# Patient Record
Sex: MALE | Race: BLACK OR AFRICAN AMERICAN | URBAN - METROPOLITAN AREA
[De-identification: names, ages, dates, MRNs, and addresses within clinical notes are randomized per-mention and may not be internally consistent; named-entity substitution may affect disease eponyms.]

---

## 2017-06-15 ENCOUNTER — OFFICE VISIT (OUTPATIENT)
Dept: ORTHOPEDIC SURGERY | Age: 52
End: 2017-06-15

## 2017-06-15 VITALS
WEIGHT: 209 LBS | SYSTOLIC BLOOD PRESSURE: 115 MMHG | HEART RATE: 60 BPM | BODY MASS INDEX: 30.96 KG/M2 | HEIGHT: 69 IN | DIASTOLIC BLOOD PRESSURE: 78 MMHG

## 2017-06-15 DIAGNOSIS — M47.816 SPONDYLOSIS WITHOUT MYELOPATHY OR RADICULOPATHY, LUMBAR REGION: ICD-10-CM

## 2017-06-15 DIAGNOSIS — M43.10 SPONDYLOLISTHESIS, ACQUIRED: ICD-10-CM

## 2017-06-15 DIAGNOSIS — M51.36 OTHER INTERVERTEBRAL DISC DEGENERATION, LUMBAR REGION: ICD-10-CM

## 2017-06-15 DIAGNOSIS — M54.50 NONSPECIFIC PAIN IN THE LUMBAR REGION: Primary | ICD-10-CM

## 2017-06-15 RX ORDER — BISMUTH SUBSALICYLATE 262 MG
1 TABLET,CHEWABLE ORAL DAILY
COMMUNITY

## 2017-06-15 RX ORDER — GABAPENTIN 300 MG/1
300 CAPSULE ORAL 3 TIMES DAILY
Qty: 90 CAP | Refills: 1 | Status: SHIPPED | OUTPATIENT
Start: 2017-06-15

## 2017-06-15 NOTE — MR AVS SNAPSHOT
Visit Information Date & Time Provider Department Dept. Phone Encounter #  
 6/15/2017  9:30 AM Axel Hager, [address] [facility] [phone] [id number] Follow-up Instructions Return in about 4 weeks (around 7/13/2017). Upcoming Health Maintenance Date Due Hepatitis C Screening 1965 DTaP/Tdap/Td series (1 - Tdap) 7/6/1985 FOBT Q 1 YEAR AGE 50-75 7/6/2014 INFLUENZA AGE 9 TO ADULT 8/1/2017 Allergies as of 6/15/2017  Review Complete On: 6/15/2017 By: Axel Hager MD  
  
 Severity Noted Reaction Type Reactions Tramadol  06/15/2017    Nausea Only, Other (comments)  
 vertigo Current Immunizations  Never Reviewed No immunizations on file. Not reviewed this visit You Were Diagnosed With   
  
 Codes Comments Nonspecific pain in the lumbar region    -  Primary ICD-10-CM: M54.5 ICD-9-CM: 724.2 Spondylolisthesis, acquired     ICD-10-CM: M43.10 ICD-9-CM: 738.4 Spondylosis without myelopathy or radiculopathy, lumbar region     ICD-10-CM: M47.816 ICD-9-CM: 721.3 Other intervertebral disc degeneration, lumbar region     ICD-10-CM: M51.36 
ICD-9-CM: 722.52 Vitals BP Pulse Height(growth percentile) Weight(growth percentile) BMI Smoking Status 115/78 60 5' 9\" (1.753 m) 209 lb (94.8 kg) 30.86 kg/m2 Never Smoker Vitals History BMI and BSA Data Body Mass Index Body Surface Area  
 30.86 kg/m 2 2.15 m 2 Preferred Pharmacy Pharmacy Name Phone [facility] [address], [address] [facility] Your Updated Medication List  
  
   
This list is accurate as of: 6/15/17 10:54 AM.  Always use your most recent med list.  
  
  
  
  
 gabapentin 300 mg capsule Commonly known as:  NEURONTIN Take 1 Cap by mouth three (3) times daily. multivitamin tablet Commonly known as:  ONE A DAY Take 1 Tab by mouth daily.   
  
 VITAMIN D2 PO  
 Take  by mouth. Prescriptions Sent to Pharmacy Refills  
 gabapentin (NEURONTIN) 300 mg capsule 1 Sig: Take 1 Cap by mouth three (3) times daily. Class: Normal  
 Pharmacy: [address] [place] [address] Ph #: [phone] Route: Oral  
  
We Performed the Following AMB POC XRAY, SPINE, LUMBOSACRAL; 2 O [49704 CPT(R)] Follow-up Instructions Return in about 4 weeks (around 2017). Patient Instructions [employer] Activation Thank you for requesting access to RIGID. Please follow the instructions below to securely access and download your online medical record. RIGID allows you to send messages to your doctor, view your test results, renew your prescriptions, schedule appointments, and more. How Do I Sign Up? 1. In your internet browser, go to [email or web address] 
2. Click on the First Time User? Click Here link in the Sign In box. You will be redirect to the New Member Sign Up page. 3. Enter your RIGID Access Code exactly as it appears below. You will not need to use this code after youve completed the sign-up process. If you do not sign up before the expiration date, you must request a new code. RIGID Access Code: [email or web address] Expires: 2017  9:07 AM (This is the date your RIGID access code will ) 4. Enter the last four digits of your Social Security Number (xxxx) and Date of Birth (mm/dd/yyyy) as indicated and click Submit. You will be taken to the next sign-up page. 5. Create a RIGID ID. This will be your RIGID login ID and cannot be changed, so think of one that is secure and easy to remember. 6. Create a RIGID password. You can change your password at any time. 7. Enter your Password Reset Question and Answer. This can be used at a later time if you forget your password. 8. Enter your e-mail address.  You will receive e-mail notification when new information is available in [employer]. 9. Click Sign Up. You can now view and download portions of your medical record. 10. Click the Download Summary menu link to download a portable copy of your medical information. Additional Information If you have questions, please visit the Frequently Asked Questions section of the [employer] website at [email or web address] Remember, [employer] is NOT to be used for urgent needs. For medical emergencies, dial 911. Introducing [facility]! Micahjose Henderson introduces [employer] patient portal. Now you can access parts of your medical record, email your doctor's office, and request medication refills online. 1. In your internet browser, go to [email or web address] 2. Click on the First Time User? Click Here link in the Sign In box. You will see the New Member Sign Up page. 3. Enter your [employer] Access Code exactly as it appears below. You will not need to use this code after youve completed the sign-up process. If you do not sign up before the expiration date, you must request a new code. · [employer] Access Code: [email or web address] Expires: 9/13/2017  9:07 AM 
 
4. Enter the last four digits of your Social Security Number (xxxx) and Date of Birth (mm/dd/yyyy) as indicated and click Submit. You will be taken to the next sign-up page. 5. Create a [employer] ID. This will be your [employer] login ID and cannot be changed, so think of one that is secure and easy to remember. 6. Create a [employer] password. You can change your password at any time. 7. Enter your Password Reset Question and Answer. This can be used at a later time if you forget your password. 8. Enter your e-mail address. You will receive e-mail notification when new information is available in [address] 9. Click Sign Up. You can now view and download portions of your medical record.  
10. Click the Download Summary menu link to download a portable copy of your medical information. If you have questions, please visit the Frequently Asked Questions section of the [employer] website. Remember, [employer] is NOT to be used for urgent needs. For medical emergencies, dial 911. Now available from your iPhone and Android! Please provide this summary of care documentation to your next provider. If you have any questions after today's visit, please call [phone].

## 2017-06-15 NOTE — PATIENT INSTRUCTIONS
"[employer] Activation    Thank you for requesting access to "[employer]. Please follow the instructions below to securely access and download your online medical record. "[employer] allows you to send messages to your doctor, view your test results, renew your prescriptions, schedule appointments, and more. How Do I Sign Up? 1. In your internet browser, go to [email or web address]  2. Click on the First Time User? Click Here link in the Sign In box. You will be redirect to the New Member Sign Up page. 3. Enter your "[employer] Access Code exactly as it appears below. You will not need to use this code after youve completed the sign-up process. If you do not sign up before the expiration date, you must request a new code. "[employer] Access Code: [email or web address]  Expires: 2017  9:07 AM (This is the date your "[employer] access code will )    4. Enter the last four digits of your Social Security Number (xxxx) and Date of Birth (mm/dd/yyyy) as indicated and click Submit. You will be taken to the next sign-up page. 5. Create a "[employer] ID. This will be your "[employer] login ID and cannot be changed, so think of one that is secure and easy to remember. 6. Create a "[employer] password. You can change your password at any time. 7. Enter your Password Reset Question and Answer. This can be used at a later time if you forget your password. 8. Enter your e-mail address. You will receive e-mail notification when new information is available in [address] 9. Click Sign Up. You can now view and download portions of your medical record. 10. Click the Download Summary menu link to download a portable copy of your medical information. Additional Information    If you have questions, please visit the Frequently Asked Questions section of the "[employer] website at [email or web address] Remember, "[employer] is NOT to be used for urgent needs. For medical emergencies, dial 911.

## 2017-06-15 NOTE — PROGRESS NOTES
[facility]  [address]  [address], [address]  Phone: [phone]  Fax: [phone]        INITIAL CONSULTATION      HISTORY OF PRESENT ILLNESS:  Brianna Krishnan is a 45 y.o. male whom is self-referred secondary to chronic lumbar pain radiating into the BLE (R>L) extending circumferentially to the feet with paraesthesias ongoing since 1988. He describes BLE as a burning sensation. 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 Prior to this injury, he denies having low back pain. Pt states he is trying to have a claim through [employer]. tHe rates pain 5/10. Pt states his symptoms are progressively worsening and his flare ups are becoming more frequent. His pain is exacerbated with any sustained movement or position. He denies h/o lumbar spinal surgery. Pt reports possible remote h/o lumbar blocks. He has not recently attended physical therapy/chiropractor. ER note dated 10/24/15 indicating patient presented for neck and shoulder pain as well as back pain. At that time, he was treated with Tramadol and Naproxen. ER note dated 10/28/16 indicating patient was seen for chronic back pain. At that time, he was treated with Diclofenac, Norco and Flexeril. ER note dated 12/8/16 indicating patient presented for chronic low back pain without sciatica. At that time, he was treated with Norco and Flexeril. ER note dated 1/19/16 indicating patient presented for chronic left-sided low back pain without sciatica. At that time, he was treated with Valium, oral steroids and Tramadol. ER note dated 2/7/17 indicating patient presented for exacerbation of chronic back pain radiating into the BLE, worse with activity and better with rest.  At that time, he was treated with Voltaren and Flexeril. He reports urinary frequency but denies incontinence. Pt denies fever, weight loss, or skin changes. Lumbar spine XR report dated 10/28/16 reviewed.  Films were not available for my review. Per report, transitional anatomy. There are 4 non-rib-bearing lumbar vertebral bodies. Age-indeterminate mild anterior wedging of L1. There is normal alignment. The intervertebral disc spaces are relatively well-preserved. Osseous mineralization is radiographically normal.  reviewed. History reviewed. No pertinent past medical history. History reviewed. No pertinent surgical history. Social History   Substance Use Topics    Smoking status: Never Smoker    Smokeless tobacco: Not on file    Alcohol use No     Work status: Not available. Marital status: Not available. Allergies   Allergen Reactions    Tramadol Nausea Only and Other (comments)     vertigo          History reviewed. No pertinent family history. REVIEW OF SYSTEMS  Constitutional symptoms: Negative  Eyes: Negative  Ears, Nose, Throat, and Mouth: Negative  Cardiovascular: Negative  Respiratory: Negative  Genitourinary: Negative  Integumentary (Skin and/or breast): Negative  Musculoskeletal: Positive for low back pain into the BLE. Extremities: Negative for edema. Endocrine/Rheumatologic: Negative  Hematologic/Lymphatic: Negative  Allergic/Immunologic: Negative  Psychiatric: Negative       PHYSICAL EXAMINATION    Visit Vitals    /78    Pulse 60    Ht 5' 9\" (1.753 m)    Wt 209 lb (94.8 kg)    BMI 30.86 kg/m2       CONSTITUTIONAL: NAD, A&O x 3  HEART: Regular rate and rhythm  ABDOMEN: Positive bowel sounds, soft, nontender, and nondistended  LUNGS: Clear to auscultation bilaterally. SKIN: No rashes noted. RANGE OF MOTION: Good ROM, bilateral hips. The patient has full passive range of motion in all four extremities. SENSATION: Increased groin pain with internal rotation of left hip. Decreased sensation to light touch at digits 2-3 of the RUE. Sensation to light touch otherwise intact.   MOTOR:   Straight Leg Raise: Negative, bilateral  Gomez: Negative, bilateral  Deep tendon reflexes are 1+ at the brachioradialis, biceps, and triceps. Deep tendon reflexes are 0 at the right knee, 2+ at the left knee and 1+ at the ankles bilaterally. Shoulder AB/Flex Elbow Flex Wrist Ext Elbow Ext Wrist Flex Hand Intrin Tone   Right +4/5 +4/5 +4/5 +4/5 +4/5 +4/5 +4/5   Left +4/5 +4/5 +4/5 +4/5 +4/5 +4/5 +4/5              Hip Flex Knee Ext Knee Flex Ankle DF GTE Ankle PF Tone   Right +4/5 +4/5 +4/5 +4/5 +4/5 +4/5 +4/5   Left +4/5 +4/5 +4/5 +4/5 +4/5 +4/5 +4/5     RADIOGRAPHS  Preliminary reading of lumbar plain films:  Slight retrolisthesis of L4 on L5 and L5 on S1. Mild disc space narrowing at L1-2 L2-3. Small anterior osteophytes noted at L2-3-4-L5. No acute pathology identified. These are being sent out for official reading by Dr. Bernie Weaver. ASSESSMENT   Debi Lopez was seen today for back pain. Diagnoses and all orders for this visit:    Nonspecific pain in the lumbar region  -     [10594] L/S 2-3 views    Spondylolisthesis, acquired    Spondylosis without myelopathy or radiculopathy, lumbar region    Other intervertebral disc degeneration, lumbar region    Other orders  -     gabapentin (NEURONTIN) 300 mg capsule; Take 1 Cap by mouth three (3) times daily. IMPRESSIONS/RECOMMENDATIONS:  He presents for chronic low back pain with bilateral radicular type symptoms ongoing x ~ 30 years. Patient is neurologically intact. I discussed with patient multiple treatment options. I will try him on Neurontin 300 mg TID. The risks, benefits, and potential side effects of this medication were discussed. Patient understands and wishes to proceed. Patient advised to call the office if intolerant to new medication. Physical therapy deferred due to patient finances. I will see the patient back in 1 month's time or earlier if needed. Written by Imelda Giron, as dictated by Fito Henriquez MD  I examined the patient, reviewed and agree with the note.

## 2017-06-23 ENCOUNTER — OFFICE VISIT (OUTPATIENT)
Dept: FAMILY MEDICINE CLINIC | Age: 52
End: 2017-06-23

## 2017-06-23 VITALS
HEIGHT: 69 IN | HEART RATE: 69 BPM | DIASTOLIC BLOOD PRESSURE: 81 MMHG | RESPIRATION RATE: 16 BRPM | TEMPERATURE: 97.7 F | WEIGHT: 210 LBS | SYSTOLIC BLOOD PRESSURE: 124 MMHG | OXYGEN SATURATION: 98 % | BODY MASS INDEX: 31.1 KG/M2

## 2017-06-23 DIAGNOSIS — M54.50 NONSPECIFIC PAIN IN THE LUMBAR REGION: Primary | ICD-10-CM

## 2017-06-23 NOTE — PROGRESS NOTES
No chief complaint on file. 1. When and where did you last receive medical care? Yes When: 6/2017 Dr. Karol Sparrow    2. When and where did you last have preventive care such as mammogram, pap smears or colon screening? No history of colon screening. 3. What is your current living situation (for example, live alone, live in home with immediate family members)? Lives with spoise    4. Do you have any problems with communication such trouble seeing, hearing, or understanding instructions? No    5. Do you have an advance directive? This is a document that you can give to family members with instructions for how you would want them to make health care decisions for you if you were unable to speak for yourself. (For example, unconscious, delerious)No    PMH/FH/Social Hx reviewed and updated as needed     Applicable screenings reviewed and updated as needed  Medication reconciliation performed. Patient does not know need medication refills. Health Maintenance reviewed.

## 2017-06-23 NOTE — PROGRESS NOTES
Discharge instructions reviewed with patient    Medication list and understanding of medications reviewed with patient. OTC and herbal medications reviewed and added to med list if applicable  Barriers to adherence assessed. Guidance given regarding new medications this visit, including reason for taking this medicine, and common side effects.     Given AVS.

## 2017-06-23 NOTE — MR AVS SNAPSHOT
Visit Information Date & Time Provider Department Dept. Phone Encounter #  
 6/23/2017  9:15 AM Lui Cabezas, [address] [place] [address] [phone] [id number] Your Appointments 8/3/2017  8:45 AM  
Follow Up with True Kumar MD  
[facility] (Angela Miller) Appt Note: 1 mo f/u low back [address] [facility] [address]  
[phone]  
  
   
 [address] [address] [facility] [address] Upcoming Health Maintenance Date Due Hepatitis C Screening 1965 DTaP/Tdap/Td series (1 - Tdap) 7/6/1985 FOBT Q 1 YEAR AGE 50-75 7/6/2014 INFLUENZA AGE 9 TO ADULT 8/1/2017 Allergies as of 6/23/2017  Review Complete On: 6/23/2017 By: Lui Cabezas MD  
  
 Severity Noted Reaction Type Reactions Tramadol  06/15/2017    Nausea Only, Other (comments)  
 vertigo Current Immunizations  Never Reviewed No immunizations on file. Not reviewed this visit You Were Diagnosed With   
  
 Codes Comments Nonspecific pain in the lumbar region    -  Primary ICD-10-CM: M54.5 ICD-9-CM: 724.2 Vitals BP Pulse Temp Resp Height(growth percentile) Weight(growth percentile) 124/81 (BP 1 Location: Left arm, BP Patient Position: Sitting) 69 97.7 °F (36.5 °C) (Oral) 16 5' 9\" (1.753 m) 210 lb (95.3 kg) SpO2 BMI Smoking Status 98% 31.01 kg/m2 Never Smoker BMI and BSA Data Body Mass Index Body Surface Area 31.01 kg/m 2 2.15 m 2 Preferred Pharmacy Pharmacy Name Phone [facility] [address], [address] [facility] Your Updated Medication List  
  
   
This list is accurate as of: 6/23/17 10:49 AM.  Always use your most recent med list.  
  
  
  
  
 gabapentin 300 mg capsule Commonly known as:  NEURONTIN Take 1 Cap by mouth three (3) times daily. multivitamin tablet Commonly known as:  ONE A DAY  
 Take 1 Tab by mouth daily. VITAMIN D2 PO Take  by mouth. Introducing [facility]! Niko Lambert introduces [employer] patient portal. Now you can access parts of your medical record, email your doctor's office, and request medication refills online. 1. In your internet browser, go to [email or web address] 2. Click on the First Time User? Click Here link in the Sign In box. You will see the New Member Sign Up page. 3. Enter your TimeLab Access Code exactly as it appears below. You will not need to use this code after youve completed the sign-up process. If you do not sign up before the expiration date, you must request a new code. · [employer] Access Code: [email or web address] Expires: 9/13/2017  9:07 AM 
 
4. Enter the last four digits of your Social Security Number (xxxx) and Date of Birth (mm/dd/yyyy) as indicated and click Submit. You will be taken to the next sign-up page. 5. Create a TimeLab ID. This will be your TimeLab login ID and cannot be changed, so think of one that is secure and easy to remember. 6. Create a [employer]Lab password. You can change your password at any time. 7. Enter your Password Reset Question and Answer. This can be used at a later time if you forget your password. 8. Enter your e-mail address. You will receive e-mail notification when new information is available in [address] 9. Click Sign Up. You can now view and download portions of your medical record. 10. Click the Download Summary menu link to download a portable copy of your medical information. If you have questions, please visit the Frequently Asked Questions section of the [employer] website. Remember, [employer] is NOT to be used for urgent needs. For medical emergencies, dial 911. Now available from your iPhone and Android! Please provide this summary of care documentation to your next provider. If you have any questions after today's visit, please call [phone].

## 2021-07-01 NOTE — PROGRESS NOTES
HISTORY OF PRESENT ILLNESS  Jenise Reaves is a 45 y.o. male. New Patient   The history is provided by the patient. This is a chronic problem. Episode onset: New patient presenting with cc of back pain. He was seen in an orthopedic office last week. He reports he was told he will eventually need an MRI. He was told he could get it done through the [facility]. The problem occurs constantly. Back Pain          Review of Systems   Constitutional: Negative. Musculoskeletal: Positive for back pain. Physical Exam   Constitutional: He appears well-developed and well-nourished. HENT:   Head: Normocephalic and atraumatic. ASSESSMENT and PLAN  Back pain: He was advised there is no cost reduction or medical advantage to have the MRI ordered through [facility]. He can f/u as scheduled with ortho and PMR. Statement Selected